# Patient Record
Sex: FEMALE | Race: WHITE | Employment: STUDENT | URBAN - NONMETROPOLITAN AREA
[De-identification: names, ages, dates, MRNs, and addresses within clinical notes are randomized per-mention and may not be internally consistent; named-entity substitution may affect disease eponyms.]

---

## 2023-10-27 ENCOUNTER — OFFICE VISIT (OUTPATIENT)
Dept: FAMILY MEDICINE CLINIC | Age: 23
End: 2023-10-27
Payer: COMMERCIAL

## 2023-10-27 VITALS
BODY MASS INDEX: 24.48 KG/M2 | OXYGEN SATURATION: 96 % | HEIGHT: 70 IN | WEIGHT: 171 LBS | HEART RATE: 111 BPM | SYSTOLIC BLOOD PRESSURE: 118 MMHG | TEMPERATURE: 97.8 F | DIASTOLIC BLOOD PRESSURE: 76 MMHG

## 2023-10-27 DIAGNOSIS — J02.9 SORE THROAT: ICD-10-CM

## 2023-10-27 DIAGNOSIS — J01.90 ACUTE NON-RECURRENT SINUSITIS, UNSPECIFIED LOCATION: Primary | ICD-10-CM

## 2023-10-27 PROBLEM — B27.90 CHRONIC EBV INFECTION: Status: ACTIVE | Noted: 2023-10-27

## 2023-10-27 PROBLEM — U09.9 POST-ACUTE COVID-19 SYNDROME: Status: ACTIVE | Noted: 2023-10-27

## 2023-10-27 PROBLEM — J30.2 SEASONAL ALLERGIC RHINITIS: Status: ACTIVE | Noted: 2023-10-27

## 2023-10-27 LAB — S PYO AG THROAT QL: NORMAL

## 2023-10-27 PROCEDURE — 99213 OFFICE O/P EST LOW 20 MIN: CPT | Performed by: NURSE PRACTITIONER

## 2023-10-27 PROCEDURE — 87880 STREP A ASSAY W/OPTIC: CPT | Performed by: NURSE PRACTITIONER

## 2023-10-27 RX ORDER — NORGESTIMATE AND ETHINYL ESTRADIOL 7DAYSX3 28
1 KIT ORAL DAILY
COMMUNITY
Start: 2023-10-02

## 2023-10-27 RX ORDER — ALBUTEROL SULFATE 90 UG/1
2 AEROSOL, METERED RESPIRATORY (INHALATION) EVERY 6 HOURS PRN
Qty: 1 EACH | Refills: 0 | Status: SHIPPED | OUTPATIENT
Start: 2023-10-27

## 2023-10-27 RX ORDER — DOXYCYCLINE HYCLATE 100 MG
100 TABLET ORAL 2 TIMES DAILY
Qty: 14 TABLET | Refills: 0 | Status: SHIPPED | OUTPATIENT
Start: 2023-10-27 | End: 2023-11-03

## 2023-10-27 SDOH — ECONOMIC STABILITY: FOOD INSECURITY: WITHIN THE PAST 12 MONTHS, THE FOOD YOU BOUGHT JUST DIDN'T LAST AND YOU DIDN'T HAVE MONEY TO GET MORE.: NEVER TRUE

## 2023-10-27 SDOH — ECONOMIC STABILITY: INCOME INSECURITY: HOW HARD IS IT FOR YOU TO PAY FOR THE VERY BASICS LIKE FOOD, HOUSING, MEDICAL CARE, AND HEATING?: NOT HARD AT ALL

## 2023-10-27 SDOH — ECONOMIC STABILITY: HOUSING INSECURITY
IN THE LAST 12 MONTHS, WAS THERE A TIME WHEN YOU DID NOT HAVE A STEADY PLACE TO SLEEP OR SLEPT IN A SHELTER (INCLUDING NOW)?: NO

## 2023-10-27 SDOH — ECONOMIC STABILITY: FOOD INSECURITY: WITHIN THE PAST 12 MONTHS, YOU WORRIED THAT YOUR FOOD WOULD RUN OUT BEFORE YOU GOT MONEY TO BUY MORE.: NEVER TRUE

## 2023-10-27 ASSESSMENT — ENCOUNTER SYMPTOMS
SHORTNESS OF BREATH: 1
DIARRHEA: 0
CHEST TIGHTNESS: 1
RHINORRHEA: 1
TROUBLE SWALLOWING: 0
NAUSEA: 1
SINUS PRESSURE: 1
SORE THROAT: 1
WHEEZING: 0
ABDOMINAL PAIN: 0
VOMITING: 0
COUGH: 1

## 2023-10-27 ASSESSMENT — PATIENT HEALTH QUESTIONNAIRE - PHQ9
1. LITTLE INTEREST OR PLEASURE IN DOING THINGS: 0
SUM OF ALL RESPONSES TO PHQ QUESTIONS 1-9: 0
SUM OF ALL RESPONSES TO PHQ9 QUESTIONS 1 & 2: 0
SUM OF ALL RESPONSES TO PHQ QUESTIONS 1-9: 0
2. FEELING DOWN, DEPRESSED OR HOPELESS: 0
SUM OF ALL RESPONSES TO PHQ QUESTIONS 1-9: 0
SUM OF ALL RESPONSES TO PHQ QUESTIONS 1-9: 0

## 2023-12-05 ENCOUNTER — OFFICE VISIT (OUTPATIENT)
Dept: FAMILY MEDICINE CLINIC | Age: 23
End: 2023-12-05
Payer: COMMERCIAL

## 2023-12-05 VITALS
DIASTOLIC BLOOD PRESSURE: 76 MMHG | SYSTOLIC BLOOD PRESSURE: 112 MMHG | OXYGEN SATURATION: 98 % | HEIGHT: 70 IN | WEIGHT: 170.6 LBS | BODY MASS INDEX: 24.42 KG/M2 | HEART RATE: 73 BPM

## 2023-12-05 DIAGNOSIS — R53.83 MALAISE AND FATIGUE: ICD-10-CM

## 2023-12-05 DIAGNOSIS — R10.9 ABDOMINAL PAIN, UNSPECIFIED ABDOMINAL LOCATION: ICD-10-CM

## 2023-12-05 DIAGNOSIS — R53.81 MALAISE AND FATIGUE: ICD-10-CM

## 2023-12-05 DIAGNOSIS — Z00.00 ENCOUNTER FOR WELLNESS EXAMINATION IN ADULT: Primary | ICD-10-CM

## 2023-12-05 LAB
BILIRUBIN, POC: NORMAL
BLOOD URINE, POC: NORMAL
CLARITY, POC: NORMAL
COLOR, POC: YELLOW
GLUCOSE URINE, POC: NORMAL
KETONES, POC: NORMAL
LEUKOCYTE EST, POC: NORMAL
NITRITE, POC: NORMAL
PH, POC: 6
PROTEIN, POC: NORMAL
SPECIFIC GRAVITY, POC: 1.03
UROBILINOGEN, POC: NORMAL

## 2023-12-05 PROCEDURE — 81003 URINALYSIS AUTO W/O SCOPE: CPT | Performed by: NURSE PRACTITIONER

## 2023-12-05 PROCEDURE — 99395 PREV VISIT EST AGE 18-39: CPT | Performed by: NURSE PRACTITIONER

## 2023-12-05 RX ORDER — FLUTICASONE PROPIONATE 50 MCG
SPRAY, SUSPENSION (ML) NASAL
COMMUNITY
Start: 2023-11-06

## 2023-12-05 ASSESSMENT — ENCOUNTER SYMPTOMS
SORE THROAT: 0
CHEST TIGHTNESS: 0
WHEEZING: 0
RHINORRHEA: 0
CONSTIPATION: 0
EYE PAIN: 0
PHOTOPHOBIA: 0
DIARRHEA: 1
SINUS PRESSURE: 0
SHORTNESS OF BREATH: 0
ABDOMINAL PAIN: 1
VOMITING: 0
COUGH: 0
NAUSEA: 0

## 2023-12-05 NOTE — PROGRESS NOTES
diaphoresis, fever and unexpected weight change. HENT:  Negative for congestion, ear pain, postnasal drip, rhinorrhea, sinus pressure and sore throat. Eyes:  Negative for photophobia, pain and visual disturbance. Respiratory:  Negative for cough, chest tightness, shortness of breath and wheezing. Cardiovascular:  Negative for chest pain, palpitations and leg swelling. Gastrointestinal:  Positive for abdominal pain and diarrhea. Negative for constipation, nausea and vomiting. Anal bleeding: Intermittent abdominal cramping. Endocrine: Negative for cold intolerance, heat intolerance, polydipsia, polyphagia and polyuria. Genitourinary:  Negative for decreased urine volume, difficulty urinating, dysuria, flank pain, frequency, pelvic pain, urgency and vaginal discharge. Musculoskeletal:  Negative for arthralgias, joint swelling and myalgias. Skin:  Negative for rash. Neurological:  Negative for dizziness, weakness, light-headedness, numbness and headaches. Psychiatric/Behavioral:  Positive for sleep disturbance. Negative for agitation, confusion and dysphoric mood. The patient is not nervous/anxious. History reviewed. No pertinent past medical history. History reviewed. No pertinent surgical history. Social History     Socioeconomic History    Marital status: Single     Spouse name: Not on file    Number of children: Not on file    Years of education: Not on file    Highest education level: Not on file   Occupational History    Not on file   Tobacco Use    Smoking status: Every Day     Types: E-Cigarettes    Smokeless tobacco: Current   Substance and Sexual Activity    Alcohol use:  Yes     Alcohol/week: 1.0 standard drink of alcohol     Types: 1 Drinks containing 0.5 oz of alcohol per week    Drug use: Never    Sexual activity: Yes     Partners: Male   Other Topics Concern    Not on file   Social History Narrative    Not on file     Social Determinants of Health     Financial Resource

## 2023-12-07 LAB — BACTERIA UR CULT: NORMAL

## 2023-12-08 DIAGNOSIS — R53.83 MALAISE AND FATIGUE: ICD-10-CM

## 2023-12-08 DIAGNOSIS — R53.81 MALAISE AND FATIGUE: ICD-10-CM

## 2023-12-08 LAB
ANION GAP SERPL CALCULATED.3IONS-SCNC: 16 MEQ/L (ref 9–15)
BASOPHILS # BLD: 0 K/UL (ref 0–0.2)
BASOPHILS NFR BLD: 0.3 %
BUN SERPL-MCNC: 13 MG/DL (ref 6–20)
CALCIUM SERPL-MCNC: 10.2 MG/DL (ref 8.5–9.9)
CHLORIDE SERPL-SCNC: 100 MEQ/L (ref 95–107)
CO2 SERPL-SCNC: 23 MEQ/L (ref 20–31)
CREAT SERPL-MCNC: 0.55 MG/DL (ref 0.5–0.9)
CRP SERPL HS-MCNC: <3 MG/L (ref 0–5)
EOSINOPHIL # BLD: 0.1 K/UL (ref 0–0.7)
EOSINOPHIL NFR BLD: 1.4 %
ERYTHROCYTE [DISTWIDTH] IN BLOOD BY AUTOMATED COUNT: 13.3 % (ref 11.5–14.5)
GLUCOSE SERPL-MCNC: 84 MG/DL (ref 70–99)
HBA1C MFR BLD: 5.5 % (ref 4.8–5.9)
HCT VFR BLD AUTO: 40.8 % (ref 37–47)
HGB BLD-MCNC: 12.7 G/DL (ref 12–16)
LYMPHOCYTES # BLD: 2.3 K/UL (ref 1–4.8)
LYMPHOCYTES NFR BLD: 35.7 %
MAGNESIUM SERPL-MCNC: 2 MG/DL (ref 1.7–2.4)
MCH RBC QN AUTO: 29 PG (ref 27–31.3)
MCHC RBC AUTO-ENTMCNC: 31.1 % (ref 33–37)
MCV RBC AUTO: 93.2 FL (ref 79.4–94.8)
MONOCYTES # BLD: 0.6 K/UL (ref 0.2–0.8)
MONOCYTES NFR BLD: 8.4 %
NEUTROPHILS # BLD: 3.5 K/UL (ref 1.4–6.5)
NEUTS SEG NFR BLD: 53.9 %
PLATELET # BLD AUTO: 311 K/UL (ref 130–400)
POTASSIUM SERPL-SCNC: 4.1 MEQ/L (ref 3.4–4.9)
RBC # BLD AUTO: 4.38 M/UL (ref 4.2–5.4)
SODIUM SERPL-SCNC: 139 MEQ/L (ref 135–144)
T4 FREE SERPL-MCNC: 1.3 NG/DL (ref 0.84–1.68)
WBC # BLD AUTO: 6.5 K/UL (ref 4.8–10.8)

## 2023-12-09 LAB
CORTISOL COLLECTION INFO: ABNORMAL
CORTISOL: 24.7 UG/DL (ref 2.7–18.4)

## 2023-12-10 LAB
EBV VCA IGG SER IA-ACNC: 51.6 U/ML (ref 0–21.9)
EBV VCA IGM SER IA-ACNC: 62.3 U/ML (ref 0–43.9)

## 2023-12-12 LAB
ANA PAT SER IF-IMP: ABNORMAL
ANA PAT SER IF-IMP: ABNORMAL
NUCLEAR IGG SER QL IF: DETECTED
NUCLEAR IGG TITR SER IF: ABNORMAL {TITER}

## 2023-12-13 LAB — VITAMIN B-12: 409 PG/ML (ref 232–1245)

## 2023-12-14 LAB
FERRITIN: 57 NG/ML (ref 13–150)
IRON % SATURATION: 26 % (ref 20–55)
IRON: 134 UG/DL (ref 37–145)
TOTAL IRON BINDING CAPACITY: 516 UG/DL (ref 250–450)
UNSATURATED IRON BINDING CAPACITY: 382 UG/DL (ref 112–347)
VITAMIN D 25-HYDROXY: 48.3 NG/ML

## 2023-12-16 LAB
C ALBICANS IGG QN: 0.95 EV
CANDIDA IGA SER IA-ACNC: 2.3 EV
CANDIDA IGM SER IA-ACNC: 1.56 EV

## 2023-12-22 ENCOUNTER — OFFICE VISIT (OUTPATIENT)
Dept: FAMILY MEDICINE CLINIC | Age: 23
End: 2023-12-22
Payer: COMMERCIAL

## 2023-12-22 VITALS
DIASTOLIC BLOOD PRESSURE: 70 MMHG | HEIGHT: 70 IN | BODY MASS INDEX: 24.54 KG/M2 | SYSTOLIC BLOOD PRESSURE: 106 MMHG | WEIGHT: 171.4 LBS | OXYGEN SATURATION: 98 % | HEART RATE: 104 BPM

## 2023-12-22 DIAGNOSIS — R76.8 POSITIVE ANA (ANTINUCLEAR ANTIBODY): Primary | ICD-10-CM

## 2023-12-22 PROCEDURE — 99213 OFFICE O/P EST LOW 20 MIN: CPT | Performed by: NURSE PRACTITIONER

## 2023-12-22 NOTE — PROGRESS NOTES
Subjective  Veronica Cespedes, 23 y.o. female presents today with:  Chief Complaint   Patient presents with    Discuss Labs     Labs completed 12/08/2023        I reviewed staff HPI/chief complaint and do agree with above    Patient presents with mother today to discuss laboratory results.  Continues to have concerns for fatigue/malaise, gastrointestinal discomfort and bloating.  Labs were positive for BIA with homogeneous pattern.  Mother states she does have a history of sjogren's syndrome otherwise denies any autoimmune disorders in the family.  Patient denies fever/chills, night sweats, weight changes, shortness of breath/nose breathing, chest pain, muscle aches or joint pain, any nausea or vomiting.          Review of Systems   Constitutional:  Positive for fatigue. Negative for chills and fever.   HENT:  Positive for rhinorrhea. Negative for congestion, ear pain, postnasal drip, sinus pressure, sore throat and trouble swallowing.    Eyes:  Negative for photophobia, pain, redness and visual disturbance.   Respiratory:  Negative for cough, chest tightness and shortness of breath.    Cardiovascular:  Negative for chest pain, palpitations and leg swelling.   Gastrointestinal:  Positive for abdominal pain and diarrhea. Negative for blood in stool, constipation, nausea and vomiting.   Endocrine: Negative for cold intolerance and heat intolerance.   Musculoskeletal:  Negative for arthralgias, joint swelling and myalgias.   Skin:  Negative for rash and wound.   Neurological:  Negative for dizziness, weakness, light-headedness, numbness and headaches.   Psychiatric/Behavioral:  Negative for behavioral problems.        No past medical history on file.  No past surgical history on file.  Social History     Socioeconomic History    Marital status: Single     Spouse name: Not on file    Number of children: Not on file    Years of education: Not on file    Highest education level: Not on file   Occupational History    Not

## 2024-01-01 ASSESSMENT — ENCOUNTER SYMPTOMS
TROUBLE SWALLOWING: 0
ABDOMINAL PAIN: 1
NAUSEA: 0
VOMITING: 0
CHEST TIGHTNESS: 0
EYE REDNESS: 0
RHINORRHEA: 1
SORE THROAT: 0
COUGH: 0
DIARRHEA: 1
CONSTIPATION: 0
PHOTOPHOBIA: 0
SHORTNESS OF BREATH: 0
EYE PAIN: 0
SINUS PRESSURE: 0
BLOOD IN STOOL: 0

## 2024-01-03 DIAGNOSIS — R76.8 POSITIVE ANA (ANTINUCLEAR ANTIBODY): ICD-10-CM

## 2024-01-04 ENCOUNTER — OFFICE VISIT (OUTPATIENT)
Dept: FAMILY MEDICINE CLINIC | Age: 24
End: 2024-01-04
Payer: COMMERCIAL

## 2024-01-04 VITALS
HEART RATE: 84 BPM | SYSTOLIC BLOOD PRESSURE: 114 MMHG | WEIGHT: 170.4 LBS | OXYGEN SATURATION: 98 % | DIASTOLIC BLOOD PRESSURE: 80 MMHG | BODY MASS INDEX: 24.39 KG/M2 | HEIGHT: 70 IN

## 2024-01-04 DIAGNOSIS — R76.8 POSITIVE ANA (ANTINUCLEAR ANTIBODY): Primary | ICD-10-CM

## 2024-01-04 DIAGNOSIS — R39.9 UTI SYMPTOMS: ICD-10-CM

## 2024-01-04 LAB
BILIRUBIN, POC: NORMAL
BLOOD URINE, POC: NORMAL
CLARITY, POC: CLEAR
COLOR, POC: YELLOW
GLUCOSE URINE, POC: NORMAL
KETONES, POC: NORMAL
LEUKOCYTE EST, POC: NORMAL
NITRITE, POC: NORMAL
PH, POC: 6
PROTEIN, POC: NORMAL
SPECIFIC GRAVITY, POC: 1.01
UROBILINOGEN, POC: NORMAL

## 2024-01-04 PROCEDURE — 81003 URINALYSIS AUTO W/O SCOPE: CPT | Performed by: NURSE PRACTITIONER

## 2024-01-04 PROCEDURE — 99213 OFFICE O/P EST LOW 20 MIN: CPT | Performed by: NURSE PRACTITIONER

## 2024-01-04 RX ORDER — DOXYCYCLINE HYCLATE 100 MG/1
CAPSULE ORAL
COMMUNITY
Start: 2023-12-26 | End: 2024-01-04 | Stop reason: ALTCHOICE

## 2024-01-04 ASSESSMENT — ENCOUNTER SYMPTOMS
CHEST TIGHTNESS: 0
DIARRHEA: 0
NAUSEA: 0
CONSTIPATION: 0
BACK PAIN: 0
VOMITING: 0
SHORTNESS OF BREATH: 0
ABDOMINAL PAIN: 0

## 2024-01-04 ASSESSMENT — PATIENT HEALTH QUESTIONNAIRE - PHQ9
SUM OF ALL RESPONSES TO PHQ QUESTIONS 1-9: 0
SUM OF ALL RESPONSES TO PHQ9 QUESTIONS 1 & 2: 0
SUM OF ALL RESPONSES TO PHQ QUESTIONS 1-9: 0
1. LITTLE INTEREST OR PLEASURE IN DOING THINGS: 0
SUM OF ALL RESPONSES TO PHQ QUESTIONS 1-9: 0
SUM OF ALL RESPONSES TO PHQ QUESTIONS 1-9: 0
2. FEELING DOWN, DEPRESSED OR HOPELESS: 0

## 2024-01-04 NOTE — PROGRESS NOTES
Behavior: Behavior normal.       Assessment & Plan    Diagnosis Orders   1. Positive BIA (antinuclear antibody)     Referral has been placed to [facility], will add preference of [facility] to referral.  Please notify if any new or worsening symptoms noted prior to seeing rheumatology   2. UTI symptoms  POCT Urinalysis No Micro (Auto)   Resolved at this time, UA negative in office today.  Please notify provider if any new or returning symptoms noted     Orders Placed This Encounter   Procedures    POCT Urinalysis No Micro (Auto)     No orders of the defined types were placed in this encounter.    There are no discontinued medications.  No follow-ups on file.      Reviewed with the patient: current clinical status, medications, activities and diet.     Side effects, adverse effects of the medication prescribed today, as well as treatment plan/ rationale and result expectations have been discussed with the patient who expresses understanding and desires to proceed.    Close follow up to evaluate treatment results and for coordination of care.  I have reviewed the patient's medical history in detail and updated the computerized patient record.    Willard Irizarry, DEION - CNP

## 2024-01-05 LAB
B. BURGDORFERI VLSE1/PEPC10 ABS, ELISA: 0.62 IV
GLIADIN IGA SER IA-ACNC: <0.72 FLU (ref 0–4.99)
GLIADIN IGG SER IA-ACNC: <0.56 FLU (ref 0–4.99)
TTG IGG SER IA-ACNC: <0.82 FLU (ref 0–4.99)

## 2024-01-18 ENCOUNTER — TELEPHONE (OUTPATIENT)
Dept: FAMILY MEDICINE CLINIC | Age: 24
End: 2024-01-18

## 2024-01-18 NOTE — TELEPHONE ENCOUNTER
Hello.  Have you heard anything further about this test? Should I contact our insurance to inquire about whether it's covered or not?   If I remember correctly, you said you can get the actual \"kit/test\" to have it done at [facility]....I could be way off with my memory ;-)  When you have a minute, please let me know what I should do next.  Thank you!

## 2024-01-18 NOTE — TELEPHONE ENCOUNTER
Mom sent this message via her own my chart. I see that you resulted on Lyme Panel on 01/03/2024. Any further testing recommendations?

## 2024-01-23 ENCOUNTER — TELEPHONE (OUTPATIENT)
Dept: FAMILY MEDICINE CLINIC | Age: 24
End: 2024-01-23

## 2024-01-23 NOTE — TELEPHONE ENCOUNTER
I attached a link for price list of [employer] testing, [email or web address]://cdn.[employer]/wp-content/uploads/IGenexPriceSheet.pdf.  It does look like it would be pretty pricey and will not be covered by insurance.

## 2024-01-25 LAB
MISCELLANEOUS LAB TEST ORDER: NORMAL
WHOPPER PROMPT: NORMAL

## 2024-06-27 ENCOUNTER — OFFICE VISIT (OUTPATIENT)
Dept: FAMILY MEDICINE CLINIC | Age: 24
End: 2024-06-27
Payer: COMMERCIAL

## 2024-06-27 VITALS
SYSTOLIC BLOOD PRESSURE: 120 MMHG | HEIGHT: 70 IN | BODY MASS INDEX: 24.48 KG/M2 | DIASTOLIC BLOOD PRESSURE: 80 MMHG | OXYGEN SATURATION: 97 % | HEART RATE: 99 BPM | WEIGHT: 171 LBS

## 2024-06-27 DIAGNOSIS — R06.02 SHORTNESS OF BREATH: Primary | ICD-10-CM

## 2024-06-27 PROCEDURE — 99213 OFFICE O/P EST LOW 20 MIN: CPT | Performed by: NURSE PRACTITIONER

## 2024-06-27 RX ORDER — CETIRIZINE HYDROCHLORIDE 10 MG/1
CAPSULE, LIQUID FILLED ORAL DAILY
COMMUNITY

## 2024-06-27 RX ORDER — CLINDAMYCIN PHOSPHATE 20 MG/G
CREAM VAGINAL
COMMUNITY
Start: 2024-03-21

## 2024-06-27 NOTE — PROGRESS NOTES
Date of Visit:  2024  Patient Name: Veronica Cespedes   Patient :  2000     CHIEF COMPLAINT:     Veronica Cespedes is a 23 y.o. female who presents today for an general visit to be evaluated for the following condition(s):  Chief Complaint   Patient presents with    Shortness of Breath     Pt states she feels sob over the past year. States sx come and go. States she can be resting a feel out of breath.        HISTORY OF PRESENT ILLNESS     I reviewed staff HPI/chief complaint and do agree with above    Patient presents today for concerns of shortness of breath that she has been experiencing.  Patient states symptoms do come and go, denies any noted exacerbating factors such as activity, heat/cold, illnesses, etc..  Denies any history of respiratory diagnosis/medical past history as a child.  Denies any fever/chills, unintentional weight loss, night sweats, chest pain/discomfort, palpitations, changes in appetite, abdominal pain/discomfort, any nausea/vomiting/diarrhea with symptoms.  Patient denies any tobacco use however she does vape for the last 3 years        REVIEW OF SYSTEM      Review of Systems   Constitutional:  Negative for appetite change, chills, fatigue and fever.   HENT:  Negative for congestion, ear pain, hearing loss, postnasal drip, rhinorrhea, sinus pressure, sinus pain, sore throat and trouble swallowing.    Respiratory:  Positive for shortness of breath. Negative for cough, chest tightness and wheezing.    Cardiovascular:  Negative for chest pain and palpitations.   Gastrointestinal:  Negative for abdominal pain, constipation, diarrhea, nausea and vomiting.   Endocrine: Negative for cold intolerance and heat intolerance.   Musculoskeletal:  Negative for arthralgias and myalgias.   Skin:  Negative for rash.   Neurological:  Negative for dizziness, weakness, light-headedness, numbness and headaches.   Hematological:  Negative for adenopathy.       REVIEWED INFORMATION      Allergies

## 2024-07-18 ENCOUNTER — OFFICE VISIT (OUTPATIENT)
Dept: FAMILY MEDICINE CLINIC | Age: 24
End: 2024-07-18
Payer: COMMERCIAL

## 2024-07-18 VITALS
WEIGHT: 172 LBS | HEART RATE: 84 BPM | BODY MASS INDEX: 24.62 KG/M2 | OXYGEN SATURATION: 97 % | HEIGHT: 70 IN | SYSTOLIC BLOOD PRESSURE: 116 MMHG | DIASTOLIC BLOOD PRESSURE: 82 MMHG
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
## 2024-07-18 DIAGNOSIS — S30.861A INSECT BITE OF ABDOMINAL WALL, INITIAL ENCOUNTER: Primary | ICD-10-CM

## 2024-07-18 PROCEDURE — 99213 OFFICE O/P EST LOW 20 MIN: CPT | Performed by: NURSE PRACTITIONER

## 2024-07-18 RX ORDER — TRIAMCINOLONE ACETONIDE 1 MG/G
CREAM TOPICAL
Qty: 30 G | Refills: 0 | Status: SHIPPED | OUTPATIENT
Start: 2024-07-18

## 2024-07-18 ASSESSMENT — ENCOUNTER SYMPTOMS
ABDOMINAL PAIN: 0
CHEST TIGHTNESS: 0
EYE PAIN: 0
SORE THROAT: 0
TROUBLE SWALLOWING: 0
COUGH: 0
COLOR CHANGE: 1
VOMITING: 0
SHORTNESS OF BREATH: 0
DIARRHEA: 0
NAUSEA: 0
WHEEZING: 0
FACIAL SWELLING: 0
RHINORRHEA: 0

## 2024-07-18 NOTE — PROGRESS NOTES
INFORMATION      Allergies   Allergen Reactions    Azithromycin Hives       Patient Active Problem List   Diagnosis    Chronic EBV infection    Post-acute COVID-19 syndrome    Seasonal allergic rhinitis       No past medical history on file.    No past surgical history on file.     Social History     Socioeconomic History    Marital status: Single     Spouse name: None    Number of children: None    Years of education: None    Highest education level: None   Tobacco Use    Smoking status: Every Day     Types: E-Cigarettes    Smokeless tobacco: Current   Substance and Sexual Activity    Alcohol use: Yes     Alcohol/week: 1.0 standard drink of alcohol     Types: 1 Drinks containing 0.5 oz of alcohol per week    Drug use: Never    Sexual activity: Yes     Partners: Male     Social Determinants of Health     Financial Resource Strain: Low Risk  (10/27/2023)    Overall Financial Resource Strain (CARDIA)     Difficulty of Paying Living Expenses: Not hard at all   Transportation Needs: Unknown (10/27/2023)    PRAPARE - Transportation     Lack of Transportation (Non-Medical): No   Housing Stability: Unknown (10/27/2023)    Housing Stability Vital Sign     Unstable Housing in the Last Year: No        PHYSICAL EXAM     Vitals:    07/18/24 0817   BP: 116/82   Site: Left Upper Arm   Position: Sitting   Cuff Size: Medium Adult   Pulse: 84   SpO2: 97%   Weight: 78 kg (172 lb)   Height: 1.778 m (5' 10\")     Physical Exam  Vitals reviewed.   Constitutional:       Appearance: Normal appearance.   HENT:      Mouth/Throat:      Mouth: Mucous membranes are moist.      Pharynx: Oropharynx is clear.   Cardiovascular:      Rate and Rhythm: Normal rate and regular rhythm.      Pulses: Normal pulses.      Heart sounds: Normal heart sounds.   Pulmonary:      Effort: Pulmonary effort is normal.      Breath sounds: Normal breath sounds.   Musculoskeletal:         General: Normal range of motion.   Skin:     General: Skin is warm and dry.

## 2025-05-28 ENCOUNTER — TELEPHONE (OUTPATIENT)
Dept: FAMILY MEDICINE CLINIC | Age: 25
End: 2025-05-28

## 2025-05-28 NOTE — TELEPHONE ENCOUNTER
Received NT call, pt states that she is more tired than normal recently and just wanted to make appt with Willard. Appt made for 6/2/25 at 4:00. Advised call the office or ER for any new or worsening sx. Declined appt for tomorrow 5/29/25.

## 2025-06-02 ENCOUNTER — OFFICE VISIT (OUTPATIENT)
Dept: FAMILY MEDICINE CLINIC | Age: 25
End: 2025-06-02
Payer: COMMERCIAL

## 2025-06-02 VITALS
DIASTOLIC BLOOD PRESSURE: 66 MMHG | OXYGEN SATURATION: 98 % | TEMPERATURE: 98 F | HEIGHT: 70 IN | HEART RATE: 84 BPM | WEIGHT: 169.2 LBS | SYSTOLIC BLOOD PRESSURE: 116 MMHG | BODY MASS INDEX: 24.22 KG/M2

## 2025-06-02 DIAGNOSIS — R53.83 MALAISE AND FATIGUE: Primary | ICD-10-CM

## 2025-06-02 DIAGNOSIS — R53.81 MALAISE AND FATIGUE: Primary | ICD-10-CM

## 2025-06-02 DIAGNOSIS — R45.86 MOOD CHANGES: ICD-10-CM

## 2025-06-02 DIAGNOSIS — G44.89 OTHER HEADACHE SYNDROME: ICD-10-CM

## 2025-06-02 PROCEDURE — 4004F PT TOBACCO SCREEN RCVD TLK: CPT | Performed by: NURSE PRACTITIONER

## 2025-06-02 PROCEDURE — G8427 DOCREV CUR MEDS BY ELIG CLIN: HCPCS | Performed by: NURSE PRACTITIONER

## 2025-06-02 PROCEDURE — 99213 OFFICE O/P EST LOW 20 MIN: CPT | Performed by: NURSE PRACTITIONER

## 2025-06-02 PROCEDURE — G8420 CALC BMI NORM PARAMETERS: HCPCS | Performed by: NURSE PRACTITIONER

## 2025-06-02 SDOH — ECONOMIC STABILITY: FOOD INSECURITY: WITHIN THE PAST 12 MONTHS, THE FOOD YOU BOUGHT JUST DIDN'T LAST AND YOU DIDN'T HAVE MONEY TO GET MORE.: NEVER TRUE

## 2025-06-02 SDOH — ECONOMIC STABILITY: INCOME INSECURITY: IN THE LAST 12 MONTHS, WAS THERE A TIME WHEN YOU WERE NOT ABLE TO PAY THE MORTGAGE OR RENT ON TIME?: NO

## 2025-06-02 SDOH — ECONOMIC STABILITY: TRANSPORTATION INSECURITY
IN THE PAST 12 MONTHS, HAS LACK OF TRANSPORTATION KEPT YOU FROM MEETINGS, WORK, OR FROM GETTING THINGS NEEDED FOR DAILY LIVING?: NO

## 2025-06-02 SDOH — ECONOMIC STABILITY: FOOD INSECURITY: WITHIN THE PAST 12 MONTHS, YOU WORRIED THAT YOUR FOOD WOULD RUN OUT BEFORE YOU GOT MONEY TO BUY MORE.: NEVER TRUE

## 2025-06-02 SDOH — ECONOMIC STABILITY: TRANSPORTATION INSECURITY
IN THE PAST 12 MONTHS, HAS THE LACK OF TRANSPORTATION KEPT YOU FROM MEDICAL APPOINTMENTS OR FROM GETTING MEDICATIONS?: NO

## 2025-06-02 ASSESSMENT — PATIENT HEALTH QUESTIONNAIRE - PHQ9
SUM OF ALL RESPONSES TO PHQ QUESTIONS 1-9: 0
1. LITTLE INTEREST OR PLEASURE IN DOING THINGS: NOT AT ALL
2. FEELING DOWN, DEPRESSED OR HOPELESS: NOT AT ALL
SUM OF ALL RESPONSES TO PHQ QUESTIONS 1-9: 0

## 2025-06-02 NOTE — PROGRESS NOTES
Date of Visit:  2025  Patient Name: Veronica Cespedes   Patient :  2000     CHIEF COMPLAINT:     Veronica Cespedes is a 24 y.o. female who presents today for an general visit to be evaluated for the following condition(s):  Chief Complaint   Patient presents with   • Blood Work     Pt states has been feeling HA, dizzy, elevated HR, SOB, and brain fog. 4-5 weeks ago she states is feeling better now.        HISTORY OF PRESENT ILLNESS     HPI    REVIEW OF SYSTEM      Review of Systems    REVIEWED INFORMATION      Allergies   Allergen Reactions   • Azithromycin Hives       Patient Active Problem List   Diagnosis   • Chronic EBV infection   • Post-acute COVID-19 syndrome   • Seasonal allergic rhinitis       No past medical history on file.    No past surgical history on file.     Social History     Socioeconomic History   • Marital status: Single     Spouse name: None   • Number of children: None   • Years of education: None   • Highest education level: None   Tobacco Use   • Smoking status: Every Day     Types: E-Cigarettes   • Smokeless tobacco: Current   Vaping Use   • Vaping status: Every Day   Substance and Sexual Activity   • Alcohol use: Yes     Alcohol/week: 1.0 standard drink of alcohol     Types: 1 Drinks containing 0.5 oz of alcohol per week   • Drug use: Never   • Sexual activity: Yes     Partners: Male     Social Drivers of Health     Financial Resource Strain: Low Risk  (10/27/2023)    Overall Financial Resource Strain (CARDIA)    • Difficulty of Paying Living Expenses: Not hard at all   Food Insecurity: No Food Insecurity (2025)    Hunger Vital Sign    • Worried About Running Out of Food in the Last Year: Never true    • Ran Out of Food in the Last Year: Never true   Transportation Needs: No Transportation Needs (2025)    PRAPARE - Transportation    • Lack of Transportation (Medical): No    • Lack of Transportation (Non-Medical): No   Housing Stability: Low Risk  (2025)    Housing

## 2025-06-02 NOTE — PROGRESS NOTES
Date of Visit:  2025  Patient Name: Veronica Cespedes   Patient :  2000     CHIEF COMPLAINT:     Veronica Cespedes is a 24 y.o. female who presents today for an general visit to be evaluated for the following condition(s):  Chief Complaint   Patient presents with    Blood Work     Pt states has been feeling HA, dizzy, elevated HR, SOB, and brain fog. 4-5 weeks ago she states is feeling better now.        HISTORY OF PRESENT ILLNESS     I reviewed staff HPI/chief complaint and do agree with above    Subjective:  - Patient reports not feeling well for a while, with a recent flare-up of symptoms possibly triggered by a virus.  - Patient has been experiencing fatigue, weight gain, joint pain, headaches, and mood changes.  - Patient reports shortness of breath which she has struggled with in the past with recent  - Patient previously had altered skin manifestations and mouth lesions which have now resolved.  - History of abnormal cortisol levels (previously measured at 3.5, with normal range starting at 4.8).  - Patient works night shift, which may be affecting circadian rhythm and hormone levels.  - Has had a positive BIA in the past, previous extensive laboratory workup performed by rheumatologist Dr. Espinoza included: BIA, sed rate, CRP, CBC, CMT, CD4, TSH, T4 free, immunoglobulin, cortisol, ACTH, Bartonella, protein electrophoresis, Robbins IgG, RNP antibodies, and DNA testing. Results were reportedly negative or not wildly abnormal  by patient and mother.  - No recent laboratory testing has been performed.            REVIEW OF SYSTEM      Review of Systems   Constitutional:  Positive for fatigue and unexpected weight change (Weight gain). Negative for activity change, appetite change, chills and fever.   Respiratory:  Positive for shortness of breath. Negative for cough, chest tightness and wheezing.    Cardiovascular:  Negative for chest pain, palpitations and leg swelling.   Gastrointestinal:  Negative

## 2025-06-05 DIAGNOSIS — R53.81 MALAISE AND FATIGUE: ICD-10-CM

## 2025-06-05 DIAGNOSIS — R53.83 MALAISE AND FATIGUE: ICD-10-CM

## 2025-06-05 LAB — TSH REFLEX: 1.53 UIU/ML (ref 0.44–3.86)

## 2025-06-06 LAB
ESTRADIOL LEVEL: 108 PG/ML
FOLLICLE STIMULATING HORMONE: 4.2 MIU/ML
SHBG SERPL-SCNC: 71 NMOL/L (ref 25–122)
T3 TOTAL: 120 NG/DL (ref 80–200)
TESTOST FREE SERPL-MCNC: 4.6 PG/ML (ref 0.8–7.4)
TESTOST SERPL-MCNC: 43 NG/DL (ref 8–48)
VITAMIN B-12: 459 PG/ML (ref 232–1245)
VITAMIN D 25-HYDROXY: 36.1 NG/ML (ref 30–100)

## 2025-06-07 LAB
EBV VCA IGG SER IA-ACNC: 57.8 U/ML (ref 0–21.9)
EBV VCA IGM SER IA-ACNC: 61.4 U/ML (ref 0–43.9)

## 2025-06-08 LAB
C ALBICANS IGG QN: 1.26 EV
CANDIDA IGA SER IA-ACNC: 3.21 EV
CANDIDA IGM SER IA-ACNC: 2 EV

## 2025-06-09 ENCOUNTER — RESULTS FOLLOW-UP (OUTPATIENT)
Dept: FAMILY MEDICINE CLINIC | Age: 25
End: 2025-06-09

## 2025-06-09 LAB — PROGEST SERPL-MCNC: 11.18 NG/ML

## 2025-06-24 ENCOUNTER — OFFICE VISIT (OUTPATIENT)
Dept: FAMILY MEDICINE CLINIC | Age: 25
End: 2025-06-24
Payer: COMMERCIAL

## 2025-06-24 VITALS
WEIGHT: 168.4 LBS | TEMPERATURE: 98.3 F | OXYGEN SATURATION: 99 % | HEART RATE: 82 BPM | HEIGHT: 70 IN | BODY MASS INDEX: 24.11 KG/M2 | SYSTOLIC BLOOD PRESSURE: 114 MMHG | DIASTOLIC BLOOD PRESSURE: 76 MMHG

## 2025-06-24 DIAGNOSIS — R53.83 MALAISE AND FATIGUE: ICD-10-CM

## 2025-06-24 DIAGNOSIS — R53.81 MALAISE AND FATIGUE: ICD-10-CM

## 2025-06-24 DIAGNOSIS — H69.91 DYSFUNCTION OF RIGHT EUSTACHIAN TUBE: Primary | ICD-10-CM

## 2025-06-24 PROCEDURE — G8420 CALC BMI NORM PARAMETERS: HCPCS | Performed by: NURSE PRACTITIONER

## 2025-06-24 PROCEDURE — G8427 DOCREV CUR MEDS BY ELIG CLIN: HCPCS | Performed by: NURSE PRACTITIONER

## 2025-06-24 PROCEDURE — 99213 OFFICE O/P EST LOW 20 MIN: CPT | Performed by: NURSE PRACTITIONER

## 2025-06-24 PROCEDURE — 4004F PT TOBACCO SCREEN RCVD TLK: CPT | Performed by: NURSE PRACTITIONER

## 2025-06-24 RX ORDER — BROMPHENIRAMINE MALEATE, PSEUDOEPHEDRINE HYDROCHLORIDE, AND DEXTROMETHORPHAN HYDROBROMIDE 2; 30; 10 MG/5ML; MG/5ML; MG/5ML
5 SYRUP ORAL 4 TIMES DAILY PRN
Qty: 118 ML | Refills: 0 | Status: SHIPPED | OUTPATIENT
Start: 2025-06-24

## 2025-06-24 RX ORDER — FLUTICASONE PROPIONATE 50 MCG
2 SPRAY, SUSPENSION (ML) NASAL 2 TIMES DAILY
COMMUNITY
Start: 2025-06-24 | End: 2025-07-24

## 2025-06-24 NOTE — PROGRESS NOTES
Last Year: No        PHYSICAL EXAM     Vitals:    06/24/25 0834   BP: 114/76   BP Site: Left Upper Arm   Patient Position: Sitting   BP Cuff Size: Medium Adult   Pulse: 82   Temp: 98.3 °F (36.8 °C)   TempSrc: Temporal   SpO2: 99%   Weight: 76.4 kg (168 lb 6.4 oz)   Height: 1.778 m (5' 10\")     Physical Exam  Constitutional:       General: She is not in acute distress.     Appearance: Normal appearance. She is not ill-appearing.   HENT:      Right Ear: Tympanic membrane, ear canal and external ear normal. There is impacted cerumen.      Left Ear: Tympanic membrane, ear canal and external ear normal. There is no impacted cerumen.      Nose: Rhinorrhea present. No congestion.      Mouth/Throat:      Mouth: Mucous membranes are moist.      Pharynx: Oropharynx is clear. No oropharyngeal exudate or posterior oropharyngeal erythema.   Eyes:      Extraocular Movements: Extraocular movements intact.      Conjunctiva/sclera: Conjunctivae normal.      Pupils: Pupils are equal, round, and reactive to light.   Cardiovascular:      Rate and Rhythm: Normal rate and regular rhythm.      Heart sounds: Normal heart sounds. No murmur heard.  Pulmonary:      Effort: Pulmonary effort is normal. No respiratory distress.      Breath sounds: Normal breath sounds. No wheezing, rhonchi or rales.   Musculoskeletal:         General: Normal range of motion.   Lymphadenopathy:      Cervical: No cervical adenopathy.   Skin:     General: Skin is warm and dry.   Neurological:      Mental Status: She is alert and oriented to person, place, and time.   Psychiatric:         Mood and Affect: Mood normal.         Behavior: Behavior normal.         ASSESSMENT/PLAN     1. Dysfunction of right eustachian tube  - brompheniramine-pseudoephedrine-DM 2-30-10 MG/5ML syrup; Take 5 mLs by mouth 4 times daily as needed for Congestion  Dispense: 118 mL; Refill: 0  - fluticasone (FLONASE) 50 MCG/ACT nasal spray; 2 sprays by Nasal route 2 times daily To each

## 2025-06-25 ENCOUNTER — TELEPHONE (OUTPATIENT)
Dept: FAMILY MEDICINE CLINIC | Age: 25
End: 2025-06-25

## 2025-06-25 NOTE — TELEPHONE ENCOUNTER
----- Message from DEION Slaughter CNP sent at 6/24/2025  9:35 AM EDT -----  Regarding: Infectious disease referral  Please let patient know that referral was placed to ID specialist and can contact at number below to schedule.    Yasmany Younger MD   (INFECTIOUS DISEASE)  Office: [phone]   Appointment: [phone]

## 2025-07-03 ASSESSMENT — ENCOUNTER SYMPTOMS
WHEEZING: 0
TROUBLE SWALLOWING: 0
SINUS PAIN: 0
DIARRHEA: 0
SHORTNESS OF BREATH: 0
SINUS PRESSURE: 0
SORE THROAT: 0
RHINORRHEA: 0
ABDOMINAL PAIN: 0
VOMITING: 0
CHEST TIGHTNESS: 0
CONSTIPATION: 0
NAUSEA: 0
COUGH: 0